# Patient Record
Sex: MALE | Race: WHITE | NOT HISPANIC OR LATINO | Employment: PART TIME
[De-identification: names, ages, dates, MRNs, and addresses within clinical notes are randomized per-mention and may not be internally consistent; named-entity substitution may affect disease eponyms.]

---

## 2022-12-13 ENCOUNTER — TRANSCRIBE ORDERS (OUTPATIENT)
Dept: OTHER | Age: 22
End: 2022-12-13

## 2022-12-13 DIAGNOSIS — F64.0 GENDER DYSPHORIA IN ADULT: Primary | ICD-10-CM

## 2022-12-21 ENCOUNTER — TELEPHONE (OUTPATIENT)
Dept: PLASTIC SURGERY | Facility: CLINIC | Age: 22
End: 2022-12-21

## 2022-12-21 NOTE — CONFIDENTIAL NOTE
[facility] :  Care Coordination Note     SITUATION   Devi Lemus is a 22 year old adult who is receiving support for:  Care Team  .    BACKGROUND     Pt is interested in full depth vaginoplasty with Dr. Lepe. Writer discussed LOS, hair removal, and added her to Dr. Lepe's waitlist for new consults.     ASSESSMENT     Surgery              CGC Assessment  Comprehensive Gender Care ([facility] [place]) Enrollment: Enrolled  Patient has a therapist: No  Letter of support #1: Requested  Letter of support #2: Requested  Surgery being considered: Yes  Vaginoplasty: Yes    Pt reports:   HRT 1 year  No other gender affirming surgeries      PLAN          Nursing Interventions:       Follow-up plan:    1. Sign up for mychart.   2. Writer to send hair removal info.        Rohini Leon

## 2022-12-28 ENCOUNTER — TELEPHONE (OUTPATIENT)
Dept: PLASTIC SURGERY | Facility: CLINIC | Age: 22
End: 2022-12-28

## 2022-12-28 NOTE — CONFIDENTIAL NOTE
[facility] :  Care Coordination Note     SITUATION   Devi Lemus (she/her) is a 22 year old adult who is receiving support for:  Care Team  .    BACKGROUND     Pt is scheduled for a full depth vaginoplasty consult with Dr. Lepe on 10/10/23.     ASSESSMENT     Surgery              CGC Assessment  Comprehensive Gender Care ([facility] [place] Enrollment: Enrolled  Patient has a therapist: No  Letter of support #1: Requested  Letter of support #2: Requested  Surgery being considered: Yes  Vaginoplasty: Yes    Pt reports:   HRT 1 year  No other gender affirming surgeries      PLAN          Nursing Interventions:       Follow-up plan:    1. Obtain LOS  2. Start hair removal if desired.       Rohini Leon

## 2023-02-12 ENCOUNTER — HEALTH MAINTENANCE LETTER (OUTPATIENT)
Age: 23
End: 2023-02-12

## 2023-03-22 ENCOUNTER — TELEPHONE (OUTPATIENT)
Dept: PLASTIC SURGERY | Facility: CLINIC | Age: 23
End: 2023-03-22
Payer: OTHER GOVERNMENT

## 2023-03-22 NOTE — CONFIDENTIAL NOTE
Writer called re: earlier consult option available for vaginoplasty consult. Writer also sent [employer] message.

## 2023-03-24 NOTE — TELEPHONE ENCOUNTER
FUTURE VISIT INFORMATION      FUTURE VISIT INFORMATION:    Date: 4/21/23    Time: 2:30pm    [place] [facility] [place] INFORMATION:    Reason for visit/diagnosis  full depth vaginoplasty    RECORDS REQUESTED FROM:       No recs to collet

## 2023-04-21 ENCOUNTER — DOCUMENTATION ONLY (OUTPATIENT)
Dept: PLASTIC SURGERY | Facility: CLINIC | Age: 23
End: 2023-04-21

## 2023-04-21 ENCOUNTER — OFFICE VISIT (OUTPATIENT)
Dept: PLASTIC SURGERY | Facility: CLINIC | Age: 23
End: 2023-04-21
Attending: FAMILY MEDICINE
Payer: OTHER GOVERNMENT

## 2023-04-21 ENCOUNTER — PRE VISIT (OUTPATIENT)
Dept: PLASTIC SURGERY | Facility: CLINIC | Age: 23
End: 2023-04-21

## 2023-04-21 VITALS
BODY MASS INDEX: 36.19 KG/M2 | HEIGHT: 74 IN | WEIGHT: 282 LBS | HEART RATE: 114 BPM | OXYGEN SATURATION: 97 % | DIASTOLIC BLOOD PRESSURE: 92 MMHG | SYSTOLIC BLOOD PRESSURE: 135 MMHG

## 2023-04-21 DIAGNOSIS — F64.0 GENDER DYSPHORIA IN ADULT: Primary | ICD-10-CM

## 2023-04-21 PROBLEM — I10 HTN (HYPERTENSION): Status: ACTIVE | Noted: 2022-02-14

## 2023-04-21 PROCEDURE — 99205 OFFICE O/P NEW HI 60 MIN: CPT | Performed by: NURSE PRACTITIONER

## 2023-04-21 RX ORDER — ATOMOXETINE 18 MG/1
CAPSULE ORAL
COMMUNITY
Start: 2023-03-20

## 2023-04-21 RX ORDER — SPIRONOLACTONE 100 MG/1
TABLET, FILM COATED ORAL
COMMUNITY
Start: 2023-03-20

## 2023-04-21 RX ORDER — MEDROXYPROGESTERONE ACETATE 2.5 MG/1
1 TABLET ORAL
COMMUNITY
Start: 2023-03-14

## 2023-04-21 RX ORDER — ESTRADIOL 2 MG/1
TABLET ORAL
COMMUNITY
Start: 2023-04-14

## 2023-04-21 ASSESSMENT — PAIN SCALES - GENERAL: PAINLEVEL: NO PAIN (0)

## 2023-04-21 NOTE — PROGRESS NOTES
Met with patient to discuss CGC program and provide [facility] information packet. Discussed the process of working toward patient's surgical goals, including the need for letters of support, prior authorization process, surgery scheduling, and an approximate timeline. Discussed potential clinical requirements, such as hair remova. Provided the patient with a diagram for hair removal and a list of referrals for hair removal, if needed for desired surgery. Pt shared that she will not need a follow-up from this RN but will reach out when she has questions.

## 2023-04-21 NOTE — PROGRESS NOTES
"    Name: Reji Lemus \"Devi\"    MRN: [id number]   YOB: 2000                 Chief Complaint:   Gender Dysphoria            History of Present Illness:   Devi is a 22 year old transgender female seen in consultation for gender dysphoria    Patient started recognizing identity around age 18 when she moved out of a bad home situation and had more time to reflect. Started socially transitioning once she started hormones.  Preferred pronouns are: she/her  The patient has been on exogenous hormones since: February 2022 (estradiol, spironolactone, progesterone).  In terms of an intimate relationship, the patient does not have a partner, but sister and brother-in-law, whom she lives with, will help her after surgery.  In terms of fertility, the patient: has no desire for biologic children and understands post-op permanent sterility    (New)  She has a therapist she has worked with for over a year who is willing to write a LOS. The patient has not obtained letters of support.     (Prior Surgery)  The patient has previously undergone no gender affirming surgeries.     Long-term surgical goals for the patient include: full depth vaginoplasty. No other procedures desired at this time.     The patient is here today expressing interest in full depth vaginoplasty.    The patient has not  begun hair removal.         Past Medical History:   No past medical history on file.         Past Surgical History:   No past surgical history on file.         Social History:     Social History     Tobacco Use     Smoking status: Never     Smokeless tobacco: Never   Vaping Use     Vaping status: Not on file   Substance Use Topics     Alcohol use: Not on file            Family History:   No family history on file.           Allergies:   No Known Allergies         Medications:     Current Outpatient Medications   Medication Sig     atomoxetine (STRATTERA) 18 MG capsule      estradiol (ESTRACE) 2 MG tablet      medroxyPROGESTERone " "(PROVERA) 2.5 MG tablet Take 1 tablet by mouth daily at 2 pm     spironolactone (ALDACTONE) 100 MG tablet      No current facility-administered medications for this visit.             Review of Systems:    ROS: ROS neg other than the symptoms noted above in the HPI.          Physical Exam:   BP (!) 135/92   Pulse 114   Ht 1.88 m (6' 2\")   Wt 127.9 kg (282 lb)   SpO2 97%   BMI 36.21 kg/m    General: age-appropriate in NAD  Resp: no respiratory distress  :  exam deferred  Neuro: grossly intact  Motor: excellent strength throughout  Skin: clear of rashes or ecchymoses.           Assessment and Plan:   22 year old transgender female with gender dysphoria    The criteria for genital surgery are specific to the type of surgery being requested.  Criteria for bottom surgery:    1. Persistent, well documented gender dysphoria;  2. Capacity to make a fully informed decision and to consent for treatment;  3. Age of consent (>18 years old)  4. If significant medical or mental health concerns are present, they must be well controlled.  5. 12 continuous months of hormone therapy as appropriate to the patient s gender goals (unless  the patient has a medical contraindication or is otherwise unable or unwilling to take  Hormones).  6. Two letters of support    The aim of hormone therapy prior to gonadectomy is primarily to introduce a period of reversible  estrogen or testosterone suppression, before the patient undergoes irreversible surgical intervention.    I reviewed the steps of orchiectomy. I reviewed the surgical procedure. I reviewed the risks and benefits including bleeding, infection and irreversible nature of the procedure. The patient would like to do orchiectomy as part of vaginoplasty.    Hair removal is a requirement prior to full depth vaginoplasty as the genital skin will be placed in the vaginal cavity. Lack of hair removal would lead to complications related to intravaginal hair. This is nearly " impossible to remove postoperatively.    She has a persistent, well documented gender dysphoria. She has capacity to make a fully informed decision and to consent for treatment. Her mental health issues are well controlled. She has been on continuous hormones for years. She does not yet have letters of support.     The patient meets all of these criteria. We discussed that gender affirmation surgery should be considered permanent. We discussed risks/complications of rectal injury, rectovaginal fistula, bleeding, fluid collection, infection, injury to surrounding structures, flap loss, sensory loss, wound dehiscence, vaginal prolapse, vaginal shrinkage/stenosis, need for lifelong dilation, urinary stream abnormalities, DVT/PE and need for revision surgery.     We discussed the option for minimal depth and full depth. She would like full depth vaginoplasty but will do more thinking before making final decision.     We also discussed the need to stop hormones tez-procedurally for 2 weeks before and after surgery.     We discussed that transgender vaginoplasty for this patient would include: penectomy, orchiectomy, clitoroplasty, labiaplasty, urethral reconstruction, creation of a vagina, skin graft, colpopexy to suspend the vagina, and scrotectomy.       Needs to start hair removal  Not ready for prior auth      Plan: Patient to start hair removal and work on obtaining two LOS for bottom surgery. Patient will contact us when done with hair removal to schedule hair check consult with Dr Lepe. Reviewed need for two LOS dated within 1 year from that appointment.     F/U: Patient to contact us to schedule hair check consult. If patient decides to proceed with orchiectomy while doing hair removal, or if she decides to do minimal depth, she will contact to schedule consult with Dr Lepe for those things. Aware she needs two LOS for any bottom surgery.    JEROME Zhong, CNP  [facility]  Comprehensive Gender Care    60 minutes spent on day of encounter doing chart review, history and exam, consultation and education, and additional activities as including in note above.

## 2023-04-21 NOTE — PATIENT INSTRUCTIONS
Thank you so much for coming in today, Devi. It was wonderful to meet you.  Our comprehensive gender care team looks forward to working with you as you move toward gender affirming surgery.  We are here to support you and appreciate your trust in our team.

## 2023-04-21 NOTE — NURSING NOTE
"Chief Complaint   Patient presents with     Consult     Full depth vaginoplasty       Vitals:    04/21/23 1217   BP: (!) 135/92   Pulse: 114   SpO2: 97%   Weight: 127.9 kg (282 lb)   Height: 1.88 m (6' 2\")       Body mass index is 36.21 kg/m .          TOMÁS NAILS EMT    "

## 2024-03-10 ENCOUNTER — HEALTH MAINTENANCE LETTER (OUTPATIENT)
Age: 24
End: 2024-03-10

## 2025-03-16 ENCOUNTER — HEALTH MAINTENANCE LETTER (OUTPATIENT)
Age: 25
End: 2025-03-16